# Patient Record
Sex: FEMALE | Race: WHITE | NOT HISPANIC OR LATINO | Employment: UNEMPLOYED | URBAN - METROPOLITAN AREA
[De-identification: names, ages, dates, MRNs, and addresses within clinical notes are randomized per-mention and may not be internally consistent; named-entity substitution may affect disease eponyms.]

---

## 2017-02-01 DIAGNOSIS — E03.9 HYPOTHYROIDISM: ICD-10-CM

## 2017-02-01 DIAGNOSIS — I10 ESSENTIAL HYPERTENSION: ICD-10-CM

## 2017-02-01 NOTE — TELEPHONE ENCOUNTER
Refill requests have been received- patient is due for a visit. I will allow renewal once a visit has been scheduled. Please message back.

## 2017-02-02 RX ORDER — BENAZEPRIL HYDROCHLORIDE AND HYDROCHLOROTHIAZIDE 10; 12.5 MG/1; MG/1
TABLET ORAL
Qty: 90 TABLET | Refills: 0 | Status: SHIPPED | OUTPATIENT
Start: 2017-02-02 | End: 2017-02-21 | Stop reason: SDUPTHER

## 2017-02-02 RX ORDER — LEVOTHYROXINE SODIUM 137 UG/1
TABLET ORAL
Qty: 90 TABLET | Refills: 0 | Status: SHIPPED | OUTPATIENT
Start: 2017-02-02 | End: 2017-04-11 | Stop reason: SDUPTHER

## 2017-02-02 NOTE — TELEPHONE ENCOUNTER
Pt vm box was full unable to leave message, sent pt my josephsmary message informing that appt has been scheduled for the 21 of feb.

## 2017-02-10 DIAGNOSIS — L70.9 ACNE, UNSPECIFIED ACNE TYPE: ICD-10-CM

## 2017-02-14 DIAGNOSIS — I10 ESSENTIAL HYPERTENSION: Primary | ICD-10-CM

## 2017-02-14 RX ORDER — SPIRONOLACTONE 50 MG/1
TABLET, FILM COATED ORAL
Qty: 180 TABLET | OUTPATIENT
Start: 2017-02-14

## 2017-02-16 ENCOUNTER — LAB VISIT (OUTPATIENT)
Dept: LAB | Facility: HOSPITAL | Age: 52
End: 2017-02-16
Attending: FAMILY MEDICINE
Payer: COMMERCIAL

## 2017-02-16 DIAGNOSIS — I10 ESSENTIAL HYPERTENSION: ICD-10-CM

## 2017-02-16 LAB
ANION GAP SERPL CALC-SCNC: 7 MMOL/L
BUN SERPL-MCNC: 15 MG/DL
CALCIUM SERPL-MCNC: 9.2 MG/DL
CHLORIDE SERPL-SCNC: 106 MMOL/L
CO2 SERPL-SCNC: 26 MMOL/L
CREAT SERPL-MCNC: 1 MG/DL
EST. GFR  (AFRICAN AMERICAN): >60 ML/MIN/1.73 M^2
EST. GFR  (NON AFRICAN AMERICAN): >60 ML/MIN/1.73 M^2
GLUCOSE SERPL-MCNC: 73 MG/DL
POTASSIUM SERPL-SCNC: 3.9 MMOL/L
SODIUM SERPL-SCNC: 139 MMOL/L

## 2017-02-16 PROCEDURE — 80048 BASIC METABOLIC PNL TOTAL CA: CPT

## 2017-02-16 PROCEDURE — 36415 COLL VENOUS BLD VENIPUNCTURE: CPT | Mod: PO

## 2017-02-21 ENCOUNTER — OFFICE VISIT (OUTPATIENT)
Dept: FAMILY MEDICINE | Facility: CLINIC | Age: 52
End: 2017-02-21
Payer: COMMERCIAL

## 2017-02-21 ENCOUNTER — LAB VISIT (OUTPATIENT)
Dept: LAB | Facility: HOSPITAL | Age: 52
End: 2017-02-21
Attending: FAMILY MEDICINE
Payer: COMMERCIAL

## 2017-02-21 VITALS
WEIGHT: 206.44 LBS | OXYGEN SATURATION: 99 % | DIASTOLIC BLOOD PRESSURE: 60 MMHG | HEART RATE: 60 BPM | HEIGHT: 66 IN | BODY MASS INDEX: 33.18 KG/M2 | TEMPERATURE: 97 F | RESPIRATION RATE: 13 BRPM | SYSTOLIC BLOOD PRESSURE: 102 MMHG

## 2017-02-21 DIAGNOSIS — Z11.59 NEED FOR HEPATITIS C SCREENING TEST: ICD-10-CM

## 2017-02-21 DIAGNOSIS — E66.9 OBESITY (BMI 30-39.9): ICD-10-CM

## 2017-02-21 DIAGNOSIS — E03.9 HYPOTHYROIDISM, UNSPECIFIED TYPE: ICD-10-CM

## 2017-02-21 DIAGNOSIS — E55.9 VITAMIN D INSUFFICIENCY: ICD-10-CM

## 2017-02-21 DIAGNOSIS — I10 ESSENTIAL HYPERTENSION: Primary | ICD-10-CM

## 2017-02-21 DIAGNOSIS — I10 ESSENTIAL HYPERTENSION: ICD-10-CM

## 2017-02-21 DIAGNOSIS — L70.9 ACNE, UNSPECIFIED ACNE TYPE: ICD-10-CM

## 2017-02-21 DIAGNOSIS — G43.109 MIGRAINE WITH AURA AND WITHOUT STATUS MIGRAINOSUS, NOT INTRACTABLE: ICD-10-CM

## 2017-02-21 LAB
25(OH)D3+25(OH)D2 SERPL-MCNC: 83 NG/ML
ALBUMIN SERPL BCP-MCNC: 3.8 G/DL
ALP SERPL-CCNC: 66 U/L
ALT SERPL W/O P-5'-P-CCNC: 27 U/L
ANION GAP SERPL CALC-SCNC: 10 MMOL/L
AST SERPL-CCNC: 30 U/L
BILIRUB SERPL-MCNC: 0.5 MG/DL
BUN SERPL-MCNC: 15 MG/DL
CALCIUM SERPL-MCNC: 9.5 MG/DL
CHLORIDE SERPL-SCNC: 105 MMOL/L
CHOLEST/HDLC SERPL: 5.1 {RATIO}
CO2 SERPL-SCNC: 24 MMOL/L
CREAT SERPL-MCNC: 1 MG/DL
EST. GFR  (AFRICAN AMERICAN): >60 ML/MIN/1.73 M^2
EST. GFR  (NON AFRICAN AMERICAN): >60 ML/MIN/1.73 M^2
GLUCOSE SERPL-MCNC: 92 MG/DL
HDL/CHOLESTEROL RATIO: 19.5 %
HDLC SERPL-MCNC: 195 MG/DL
HDLC SERPL-MCNC: 38 MG/DL
LDLC SERPL CALC-MCNC: 132.4 MG/DL
NONHDLC SERPL-MCNC: 157 MG/DL
POTASSIUM SERPL-SCNC: 4.2 MMOL/L
PROT SERPL-MCNC: 7.3 G/DL
SODIUM SERPL-SCNC: 139 MMOL/L
T4 FREE SERPL-MCNC: 1.35 NG/DL
TRIGL SERPL-MCNC: 123 MG/DL
TSH SERPL DL<=0.005 MIU/L-ACNC: 1.15 UIU/ML

## 2017-02-21 PROCEDURE — 3078F DIAST BP <80 MM HG: CPT | Mod: S$GLB,,, | Performed by: FAMILY MEDICINE

## 2017-02-21 PROCEDURE — 36415 COLL VENOUS BLD VENIPUNCTURE: CPT | Mod: PO

## 2017-02-21 PROCEDURE — 80053 COMPREHEN METABOLIC PANEL: CPT

## 2017-02-21 PROCEDURE — 84443 ASSAY THYROID STIM HORMONE: CPT

## 2017-02-21 PROCEDURE — 82306 VITAMIN D 25 HYDROXY: CPT

## 2017-02-21 PROCEDURE — 80061 LIPID PANEL: CPT

## 2017-02-21 PROCEDURE — 99999 PR PBB SHADOW E&M-EST. PATIENT-LVL III: CPT | Mod: PBBFAC,,, | Performed by: FAMILY MEDICINE

## 2017-02-21 PROCEDURE — 99214 OFFICE O/P EST MOD 30 MIN: CPT | Mod: S$GLB,,, | Performed by: FAMILY MEDICINE

## 2017-02-21 PROCEDURE — 86803 HEPATITIS C AB TEST: CPT

## 2017-02-21 PROCEDURE — 84439 ASSAY OF FREE THYROXINE: CPT

## 2017-02-21 PROCEDURE — 3074F SYST BP LT 130 MM HG: CPT | Mod: S$GLB,,, | Performed by: FAMILY MEDICINE

## 2017-02-21 RX ORDER — FROVATRIPTAN SUCCINATE 2.5 MG/1
2.5 TABLET, FILM COATED ORAL
Qty: 27 TABLET | Refills: 1 | Status: SHIPPED | OUTPATIENT
Start: 2017-02-21

## 2017-02-21 RX ORDER — BENAZEPRIL HYDROCHLORIDE AND HYDROCHLOROTHIAZIDE 10; 12.5 MG/1; MG/1
1 TABLET ORAL DAILY
Qty: 90 TABLET | Refills: 1 | Status: SHIPPED | OUTPATIENT
Start: 2017-02-21 | End: 2017-09-21 | Stop reason: SDUPTHER

## 2017-02-21 RX ORDER — SUMATRIPTAN 20 MG/1
1 SPRAY NASAL
Qty: 16 EACH | Refills: 1 | Status: SHIPPED | OUTPATIENT
Start: 2017-02-21

## 2017-02-21 RX ORDER — DICLOFENAC SODIUM 50 MG/1
TABLET, DELAYED RELEASE ORAL
Refills: 1 | COMMUNITY
Start: 2017-02-18

## 2017-02-21 RX ORDER — SPIRONOLACTONE 50 MG/1
50 TABLET, FILM COATED ORAL 2 TIMES DAILY
Qty: 180 TABLET | Refills: 1 | Status: SHIPPED | OUTPATIENT
Start: 2017-02-21
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
## 2017-02-21 NOTE — MR AVS SNAPSHOT
[facility]  [address]  [facility] [address]  Phone: [phone]  Fax: [phone]                  Melodie Last   2017 7:00 AM   Office Visit    Description:  Female : 1965   Provider:  Lea Muir MD   Department:  [facility]           Reason for Visit     Chronic Care           Diagnoses this Visit        Comments    Essential hypertension    -  Primary     Migraine with aura and without status migrainosus, not intractable         Acne, unspecified acne type         Hypothyroidism, unspecified type         Vitamin D insufficiency         Obesity (BMI 30-39.9)         Need for hepatitis C screening test                To Do List           Future Appointments        Provider Department Dept Phone    2017 9:10 AM [facility], [facility] [facility]     2017 7:00 AM Lea Muir MD [facility] [phone]      Goals (5 Years of Data)     None      Follow-Up and Disposition     Return in about 6 months (around 2017).    Follow-up and Disposition History       These Medications        Disp Refills Start End    sumatriptan (IMITREX) 20 mg/actuation nasal spray 16 each 1 2017     1 spray (20 mg total) by Nasal route every 2 (two) hours as needed for Migraine. - Nasal    [facility] - [address] Ph #: [phone]       spironolactone (ALDACTONE) 50 MG tablet 180 tablet 1 2017     Take 1 tablet (50 mg total) by mouth 2 (two) times daily. - Oral    [facility]: [facility] - [address] Ph #: [phone]       frovatriptan (FROVA) 2.5 MG tablet 27 tablet 1 2017     Take 1 tablet (2.5 mg total) by mouth as needed for Migraine. If recurs, may repeat after 2 hours. Max of 3 tabs in 24 hours. - Oral    Pharmacy: [facility] - [address] Ph #:  [phone]       benazepril-hydrochlorthiazide (LOTENSIN HCT) 10-12.5 mg Tab 90 tablet 1 2/21/2017     Take 1 tablet by mouth once daily. - Oral    Pharmacy: [facility] - [address] #: [phone]         [facility] On Call     [facility] On Call Nurse Care Line - 24/7 Assistance  Registered nurses in the [facility] On Call Center provide clinical advisement, health education, appointment booking, and other advisory services.  Call for this free service at [phone].             Medications           Message regarding Medications     Verify the changes and/or additions to your medication regime listed below are the same as discussed with your clinician today.  If any of these changes or additions are incorrect, please notify your healthcare provider.        CHANGE how you are taking these medications     Start Taking Instead of    benazepril-hydrochlorthiazide (LOTENSIN HCT) 10-12.5 mg Tab benazepril-hydrochlorthiazide (LOTENSIN HCT) 10-12.5 mg Tab    Dosage:  Take 1 tablet by mouth once daily. Dosage:  Take 1 tablet by mouth once daily    Reason for Change:  Reorder            Verify that the below list of medications is an accurate representation of the medications you are currently taking.  If none reported, the list may be blank. If incorrect, please contact your healthcare provider. Carry this list with you in case of emergency.           Current Medications     benazepril-hydrochlorthiazide (LOTENSIN HCT) 10-12.5 mg Tab Take 1 tablet by mouth once daily.    CALCIUM & MAGNESIUM CARBONATES ORAL Take by mouth.    coQ10, ubiquinol, 100 mg Cap Take by mouth once daily.    diclofenac (VOLTAREN) 50 MG EC tablet TK 1 T PO BID WF    frovatriptan (FROVA) 2.5 MG tablet Take 1 tablet (2.5 mg total) by mouth as needed for Migraine. If recurs, may repeat after 2 hours. Max of 3 tabs in 24 hours.    levothyroxine (SYNTHROID) 137 MCG Tab tablet Take 1 tablet by mouth  before breakfast     "magnesium 250 mg Tab Take 500 tablets by mouth once daily.    multivitamin (ONE DAILY MULTIVITAMIN) per tablet Take 1 tablet by mouth once daily.    omega-3 fatty acids-vitamin E (FISH OIL) 1,000 mg Cap Take by mouth.    spironolactone (ALDACTONE) 50 MG tablet Take 1 tablet (50 mg total) by mouth 2 (two) times daily.    sumatriptan (IMITREX) 20 mg/actuation nasal spray 1 spray (20 mg total) by Nasal route every 2 (two) hours as needed for Migraine.    vitamin D 1000 units Tab Take 2,000 mg by mouth once daily.           Clinical Reference Information           Your Vitals Were     BP Pulse Temp Resp Height Weight    102/60 60 97.1 °F (36.2 °C) (Temporal) 13 5' 6" (1.676 m) 93.6 kg (206 lb 7.4 oz)    Last Period SpO2 BMI          02/07/2017 99% 33.32 kg/m2        Blood Pressure          Most Recent Value    BP  102/60      Allergies as of 2/21/2017     Sulfa (Sulfonamide Antibiotics)      Immunizations Administered on Date of Encounter - 2/21/2017     None      Orders Placed During Today's Visit     Future Labs/Procedures Expected by Expires    Comprehensive metabolic panel  2/21/2017 4/22/2018    Hepatitis C antibody  2/21/2017 4/22/2018    Lipid panel  2/21/2017 4/22/2018    T4, free  2/21/2017 4/22/2018    TSH  2/21/2017 4/22/2018    Vitamin D  2/21/2017 4/22/2018      Language Assistance Services     ATTENTION: Language assistance services are available, free of charge. Please call [phone].      ATENCIÓN: Si habla español, tiene a morrissey disposición servicios gratuitos de asistencia lingüística. Llame al [phone].     ALIVIA Ý: N?u b?n nói Ti?ng Vi?t, có các d?ch v? h? tr? ngôn ng? mi?n phí dành cho b?n. G?i s? [phone].         [facility] complies with applicable Federal civil rights laws and does not discriminate on the basis of race, color, national origin, age, disability, or sex.        "

## 2017-02-22 LAB — HCV AB SERPL QL IA: NEGATIVE

## 2017-02-27 ENCOUNTER — TELEPHONE (OUTPATIENT)
Dept: FAMILY MEDICINE | Facility: CLINIC | Age: 52
End: 2017-02-27

## 2017-02-27 ENCOUNTER — OFFICE VISIT (OUTPATIENT)
Dept: URGENT CARE | Facility: CLINIC | Age: 52
End: 2017-02-27
Payer: COMMERCIAL

## 2017-02-27 ENCOUNTER — HOSPITAL ENCOUNTER (OUTPATIENT)
Dept: RADIOLOGY | Facility: HOSPITAL | Age: 52
Discharge: HOME | End: 2017-02-27
Attending: NURSE PRACTITIONER
Payer: COMMERCIAL

## 2017-02-27 VITALS
OXYGEN SATURATION: 100 % | SYSTOLIC BLOOD PRESSURE: 100 MMHG | HEART RATE: 76 BPM | TEMPERATURE: 98 F | DIASTOLIC BLOOD PRESSURE: 70 MMHG | RESPIRATION RATE: 18 BRPM

## 2017-02-27 DIAGNOSIS — S99.912A LEFT ANKLE INJURY, INITIAL ENCOUNTER: Primary | ICD-10-CM

## 2017-02-27 DIAGNOSIS — R93.89 ABNORMAL X-RAY: ICD-10-CM

## 2017-02-27 PROCEDURE — 99999 PR PBB SHADOW E&M-EST. PATIENT-LVL III: CPT | Mod: PBBFAC,,, | Performed by: NURSE PRACTITIONER

## 2017-02-27 PROCEDURE — 73610 X-RAY EXAM OF ANKLE: CPT | Mod: TC,PO,LT

## 2017-02-27 PROCEDURE — 73630 X-RAY EXAM OF FOOT: CPT | Mod: 26,LT,, | Performed by: RADIOLOGY

## 2017-02-27 PROCEDURE — 3078F DIAST BP <80 MM HG: CPT | Mod: S$GLB,,, | Performed by: NURSE PRACTITIONER

## 2017-02-27 PROCEDURE — 3074F SYST BP LT 130 MM HG: CPT | Mod: S$GLB,,, | Performed by: NURSE PRACTITIONER

## 2017-02-27 PROCEDURE — 99213 OFFICE O/P EST LOW 20 MIN: CPT | Mod: S$GLB,,, | Performed by: NURSE PRACTITIONER

## 2017-02-27 PROCEDURE — 73630 X-RAY EXAM OF FOOT: CPT | Mod: TC,PO,LT

## 2017-02-27 PROCEDURE — 73610 X-RAY EXAM OF ANKLE: CPT | Mod: 26,LT,, | Performed by: RADIOLOGY

## 2017-02-27 PROCEDURE — 1160F RVW MEDS BY RX/DR IN RCRD: CPT | Mod: S$GLB,,, | Performed by: NURSE PRACTITIONER

## 2017-02-27 NOTE — TELEPHONE ENCOUNTER
Note      Please let patient know C scope and EGD records have been received from [facility]. Last C scope was in January 2011 with repeat recommended in 10 years. EGD was performed at that time with no plans for repeat. Records will be updated.            Pt was notified

## 2017-02-27 NOTE — TELEPHONE ENCOUNTER
Please let patient know C scope and EGD records have been received from [facility]. Last C scope was in January 2011 with repeat recommended in 10 years. EGD was performed at that time with no plans for repeat. Records will be updated.

## 2017-02-27 NOTE — TELEPHONE ENCOUNTER
----- Message from Kaleb Brian sent at 2/27/2017 11:57 AM CST -----  Contact: PT   States she's rtn nurses call and can be reached at [phone]//thanks/dbw

## 2017-02-27 NOTE — MR AVS SNAPSHOT
[facility]  [address]  [facility] [address]  Phone: [phone]  Fax: [phone]                  Melodie Last   2017 5:20 PM   Office Visit    Description:  Female : 1965   Provider:  Kera Alford NP   Department:  [facility]           Diagnoses this Visit        Comments    Left ankle injury, initial encounter    -  Primary     Abnormal x-ray                To Do List           Future Appointments        Provider Department Dept Phone    2017 7:00 AM Lea Muir MD [facility] [phone]      Goals (5 Years of Data)     None      [facility] On Call     [facility] On Call Nurse Care Line -  Assistance  Registered nurses in the [facility] On Call Center provide clinical advisement, health education, appointment booking, and other advisory services.  Call for this free service at [phone].             Medications           Message regarding Medications     Verify the changes and/or additions to your medication regime listed below are the same as discussed with your clinician today.  If any of these changes or additions are incorrect, please notify your healthcare provider.             Verify that the below list of medications is an accurate representation of the medications you are currently taking.  If none reported, the list may be blank. If incorrect, please contact your healthcare provider. Carry this list with you in case of emergency.           Current Medications     benazepril-hydrochlorthiazide (LOTENSIN HCT) 10-12.5 mg Tab Take 1 tablet by mouth once daily.    CALCIUM & MAGNESIUM CARBONATES ORAL Take by mouth.    coQ10, ubiquinol, 100 mg Cap Take by mouth once daily.    diclofenac (VOLTAREN) 50 MG EC tablet TK 1 T PO BID WF    frovatriptan (FROVA) 2.5 MG tablet Take 1 tablet (2.5 mg total) by mouth as needed for Migraine. If recurs, may repeat after 2 hours. Max of 3 tabs in 24 hours.     levothyroxine (SYNTHROID) 137 MCG Tab tablet Take 1 tablet by mouth  before breakfast    magnesium 250 mg Tab Take 500 tablets by mouth once daily.    multivitamin (ONE DAILY MULTIVITAMIN) per tablet Take 1 tablet by mouth once daily.    omega-3 fatty acids-vitamin E (FISH OIL) 1,000 mg Cap Take by mouth.    spironolactone (ALDACTONE) 50 MG tablet Take 1 tablet (50 mg total) by mouth 2 (two) times daily.    sumatriptan (IMITREX) 20 mg/actuation nasal spray 1 spray (20 mg total) by Nasal route every 2 (two) hours as needed for Migraine.    vitamin D 1000 units Tab Take 2,000 mg by mouth once daily.           Clinical Reference Information           Your Vitals Were     BP                   100/70 (BP Location: Left arm, Patient Position: Sitting, BP Method: Manual)           Blood Pressure          Most Recent Value    BP  100/70      Allergies as of 2/27/2017     Sulfa (Sulfonamide Antibiotics)      Immunizations Administered on Date of Encounter - 2/27/2017     None      Orders Placed During Today's Visit      Normal Orders This Visit    Ambulatory referral to Podiatry     X-Ray Ankle Complete Left     X-Ray Foot Complete Left       Instructions      Understanding an Ankle Fracture     The ankle is formed by bones in the lower leg (tibia and fibula) and the bone on top of the foot (talus). When you have a fracture of the ankle, it means that one or more of the bones in the ankle are broken. The bone may be cracked, broken into two or more pieces, or even shattered. The pieces of bone may be lined up or they may have moved out of place. Sometimes, the bone may break through the skin. Nearby ligaments may also be damaged. Depending on how badly the bone is broken, healing may take a few months or longer.   What causes an ankle fracture?  Ankle fractures are often caused from severely twisting or rolling the ankle. They may also be caused from a fall, blow, accident, or sports injury.  Symptoms of an ankle  fracture  Symptoms can include pain, swelling, and bruising. If the bone breaks through the skin, bleeding at the site can also occur. The ankle may look crooked, deformed, or bent. Also, it may be hard to move or use the ankle and foot as you would normally.  Treating an ankle fracture  Treatment for an ankle fracture depends on where the bone is broken and how serious the break is. If needed, the bone is put back into place. This may be done with or without surgery. If surgery is needed, the surgeon may use devices such as pins, plates, or screws to hold the bone together. Usually, you will wear a splint, brace, or short leg cast to keep the bone in place and protect it from injury during healing. Other treatments may also be used to help reduce symptoms or regain function. These include:  · Rest. You may need to avoid walking or putting any weight on the broken ankle for a period of time. Severe fractures need a longer limit on weight-bearing activities.  · Cold packs. Putting an ice pack over the injured area may help reduce swelling and pain.  · Compression. An elastic bandage may be wrapped around the ankle to help reduce swelling.  · Elevation. Propping up the ankle so that it is above your heart may ease swelling.  · Pain medicines. Prescription or over-the-counter pain medicines may help reduce pain and swelling. If needed, stronger pain medicines may be prescribed.  · Exercises. Your healthcare provider may give you certain exercises to do at home or with a physical therapist. These help restore strength, flexibility, and range of motion in the ankle and foot.  Possible complications of an ankle fracture  These can include:  · Poor healing of the bone  · Weakness, stiffness, or loss of range of motion in the ankle  · Osteoarthritis in the ankle  When to call your healthcare provider  Call your healthcare provider right away if you have any of these:  · Fever of 100.4°F (38°C) or higher, or as  directed  · Symptoms that dont get better with treatment, or get worse  · Numbness, tingling, or coldness in your foot or toes  · Toenails that turn blue or grey in color  · A splint, brace, or cast that is damaged or feels too tight or loose  · Unusual redness, warmth, swelling, bleeding, or drainage from any wounds or incision sites  · New symptoms   Date Last Reviewed: 3/10/2016  © 0532-4060 [employer]. [address] [place] [address]. All rights reserved. This information is not intended as a substitute for professional medical care. Always follow your healthcare professional's instructions.             Language Assistance Services     ATTENTION: Language assistance services are available, free of charge. Please call [phone].      ATENCIÓN: Si enrrique mccabe, tiene a morrissey disposición servicios gratuitos de asistencia lingüística. Llame al [phone].     CHÚ Ý: N?u b?n nói Ti?ng Vi?t, có các d?ch v? h? tr? ngôn ng? mi?n phí dành cho b?n. G?i s? [phone].         [facility] complies with applicable Federal civil rights laws and does not discriminate on the basis of race, color, national origin, age, disability, or sex.

## 2017-02-28 ENCOUNTER — OFFICE VISIT (OUTPATIENT)
Dept: PODIATRY | Facility: CLINIC | Age: 52
End: 2017-02-28
Payer: COMMERCIAL

## 2017-02-28 VITALS
SYSTOLIC BLOOD PRESSURE: 113 MMHG | WEIGHT: 206.38 LBS | BODY MASS INDEX: 33.17 KG/M2 | DIASTOLIC BLOOD PRESSURE: 69 MMHG | HEIGHT: 66 IN | HEART RATE: 71 BPM

## 2017-02-28 DIAGNOSIS — S82.892A ANKLE FRACTURE, LEFT, CLOSED, INITIAL ENCOUNTER: Primary | ICD-10-CM

## 2017-02-28 PROCEDURE — 99204 OFFICE O/P NEW MOD 45 MIN: CPT | Mod: S$GLB,,, | Performed by: PODIATRIST

## 2017-02-28 PROCEDURE — 99999 PR PBB SHADOW E&M-EST. PATIENT-LVL III: CPT | Mod: PBBFAC,,, | Performed by: PODIATRIST

## 2017-02-28 PROCEDURE — 1160F RVW MEDS BY RX/DR IN RCRD: CPT | Mod: S$GLB,,, | Performed by: PODIATRIST

## 2017-02-28 PROCEDURE — 3074F SYST BP LT 130 MM HG: CPT | Mod: S$GLB,,, | Performed by: PODIATRIST

## 2017-02-28 PROCEDURE — 3078F DIAST BP <80 MM HG: CPT | Mod: S$GLB,,, | Performed by: PODIATRIST

## 2017-02-28 NOTE — PATIENT INSTRUCTIONS
[employer]    Address  [address]  [place] [address]  Contact  Phone: [phone]  Toll [phone]    Knee scooters for rent: App[employer] $50.00/month

## 2017-02-28 NOTE — PROGRESS NOTES
Subjective:       Patient ID: Melodie Last is a 52 y.o. female.    Chief Complaint: No chief complaint on file.    Ankle Injury    The incident occurred 6 to 12 hours ago. The injury mechanism was a twisting injury. The pain is present in the left ankle. The quality of the pain is described as aching. The pain is at a severity of 7/10. The pain is moderate. Pertinent negatives include no inability to bear weight, loss of motion, loss of sensation, muscle weakness, numbness or tingling. She has tried NSAIDs for the symptoms. The treatment provided no relief.     Review of Systems   Musculoskeletal: Positive for gait problem and joint swelling. Negative for back pain.   Skin: Negative for wound.   Neurological: Negative for tingling and numbness.       Objective:      Physical Exam   Musculoskeletal:        Left ankle: She exhibits swelling and ecchymosis. She exhibits normal range of motion, no deformity, no laceration and normal pulse. Tenderness. Lateral malleolus tenderness found. Achilles tendon normal.        Left foot: There is tenderness and swelling. There is normal range of motion, no bony tenderness, normal capillary refill, no crepitus, no deformity and no laceration.        Feet:        Assessment:       1. Left ankle injury, initial encounter    2. Abnormal x-ray        Plan:   Diagnoses and all orders for this visit:    Left ankle injury, initial encounter  -     X-Ray Ankle Complete Left  -     X-Ray Foot Complete Left  -     Ambulatory referral to Podiatry    Abnormal x-ray  -     Ambulatory referral to Podiatry      3 views left ankle and 3 views left foot demonstrate a mild hallux valgus deformity with bunion bunion formation. There is mild forefoot joint space narrowing. A small corticated ossification projects inferior to the lateral malleolus. A small avulsion injury cannot be excluded.    Posterior/ankle stirrup splint applied, crutches provided. Counseled on using ice, continue NSAIDs, gentle  ROM, elevation, rest, avoid aggravating factors., non weightbearing. Follow up with podiatry as scheduled or sooner if fever, redness/color change, increased pain, increased swelling to area and/or numbness/tingling to extremity.  -     Diagnosis, treatment, AVS reviewed with patient  -     Patient understands and agrees with plan

## 2017-02-28 NOTE — LETTER
March 5, 2017      Kera Alford NP  [address]  [facility] [address]           [facility]  [id number] [place] [address]  Phone: [phone]  Fax: [phone]          Patient: Melodie Last   MR Number: [id number]   YOB: 1965   Date of Visit: 2/28/2017       Dear Kera Alford:    Thank you for referring Melodie Last to me for evaluation. Attached you will find relevant portions of my assessment and plan of care.    If you have questions, please do not hesitate to call me. I look forward to following Melodie Last along with you.    Sincerely,    Sadia Hinds DPM    Enclosure  CC:  No Recipients    If you would like to receive this communication electronically, please contact [email or web address] or [phone] to request more information on [employer] access.    For providers and/or their staff who would like to refer a patient to [facility], please contact us through our one-stop-shop provider referral line, Jenifer Moreno, at [phone].    If you feel you have received this communication in error or would no longer like to receive these types of communications, please e-mail [email or web address]

## 2017-02-28 NOTE — MR AVS SNAPSHOT
[facility]  [address] [facility] [address]  Phone: [phone]  Fax: [phone]                  Melodie Last   2017 2:20 PM   Office Visit    Description:  Female : 1965   Provider:  Sadia Hinds DPM   Department:  [facility]           Reason for Visit     Ankle Injury           Diagnoses this Visit        Comments    Ankle fracture, left, closed, initial encounter    -  Primary            To Do List           Future Appointments        Provider Department Dept Phone    3/28/2017 4:40 PM Sadia Hinds DPM [facility] [phone]    2017 7:00 AM Lea Muir MD [facility] [phone]      Goals (5 Years of Data)     None      [facility]     [facility] On Call Nurse Care Line -  Assistance  Registered nurses in the [facility] On Call Center provide clinical advisement, health education, appointment booking, and other advisory services.  Call for this free service at [phone].             Medications           Message regarding Medications     Verify the changes and/or additions to your medication regime listed below are the same as discussed with your clinician today.  If any of these changes or additions are incorrect, please notify your healthcare provider.             Verify that the below list of medications is an accurate representation of the medications you are currently taking.  If none reported, the list may be blank. If incorrect, please contact your healthcare provider. Carry this list with you in case of emergency.           Current Medications     benazepril-hydrochlorthiazide (LOTENSIN HCT) 10-12.5 mg Tab Take 1 tablet by mouth once daily.    CALCIUM & MAGNESIUM CARBONATES ORAL Take by mouth.    coQ10, ubiquinol, 100 mg Cap Take by mouth once daily.    diclofenac (VOLTAREN) 50 MG EC tablet TK 1 T PO BID WF    frovatriptan (FROVA) 2.5 MG tablet Take 1 tablet (2.5 mg total) by mouth as needed  for Migraine. If recurs, may repeat after 2 hours. Max of 3 tabs in 24 hours.    levothyroxine (SYNTHROID) 137 MCG Tab tablet Take 1 tablet by mouth  before breakfast    magnesium 250 mg Tab Take 500 tablets by mouth once daily.    multivitamin (ONE DAILY MULTIVITAMIN) per tablet Take 1 tablet by mouth once daily.    omega-3 fatty acids-vitamin E (FISH OIL) 1,000 mg Cap Take by mouth.    spironolactone (ALDACTONE) 50 MG tablet Take 1 tablet (50 mg total) by mouth 2 (two) times daily.    sumatriptan (IMITREX) 20 mg/actuation nasal spray 1 spray (20 mg total) by Nasal route every 2 (two) hours as needed for Migraine.    vitamin D 1000 units Tab Take 2,000 mg by mouth once daily.           Clinical Reference Information           Your Vitals Were     BP                   113/69 (BP Location: Right arm, Patient Position: Sitting, BP Method: Automatic)           Blood Pressure          Most Recent Value    BP  113/69      Allergies as of 2/28/2017     Sulfa (Sulfonamide Antibiotics)      Immunizations Administered on Date of Encounter - 2/28/2017     None      Orders Placed During Today's Visit     Future Labs/Procedures Expected by Expires    X-Ray Ankle Complete Left  2/28/2017 2/28/2018      Instructions    ACTION REHAB AND SUPPLY [employer]    Address  [facility] Chris Ramos [address] [place] [id number]  Contact  Phone: [phone]  Toll [phone]    Knee scooters for rent: [employer] $50.00/month         Language Assistance Services     ATTENTION: Language assistance services are available, free of charge. Please call [phone].      ATENCIÓN: Si habla eliot, tiene a morrissey disposición servicios gratuitos de asistencia lingüística. Llame al [phone].     [facility] Ý: N?u b?n nói Ti?ng Vi?t, có các d?ch v? h? tr? ngôn ng? mi?n phí dành cho b?n. G?i s? [phone].         [facility] complies with applicable Federal civil rights laws and does not discriminate on the basis of race, color, national origin, age,  disability, or sex.

## 2017-02-28 NOTE — PATIENT INSTRUCTIONS
Understanding an Ankle Fracture     The ankle is formed by bones in the lower leg (tibia and fibula) and the bone on top of the foot (talus). When you have a fracture of the ankle, it means that one or more of the bones in the ankle are broken. The bone may be cracked, broken into two or more pieces, or even shattered. The pieces of bone may be lined up or they may have moved out of place. Sometimes, the bone may break through the skin. Nearby ligaments may also be damaged. Depending on how badly the bone is broken, healing may take a few months or longer.   What causes an ankle fracture?  Ankle fractures are often caused from severely twisting or rolling the ankle. They may also be caused from a fall, blow, accident, or sports injury.  Symptoms of an ankle fracture  Symptoms can include pain, swelling, and bruising. If the bone breaks through the skin, bleeding at the site can also occur. The ankle may look crooked, deformed, or bent. Also, it may be hard to move or use the ankle and foot as you would normally.  Treating an ankle fracture  Treatment for an ankle fracture depends on where the bone is broken and how serious the break is. If needed, the bone is put back into place. This may be done with or without surgery. If surgery is needed, the surgeon may use devices such as pins, plates, or screws to hold the bone together. Usually, you will wear a splint, brace, or short leg cast to keep the bone in place and protect it from injury during healing. Other treatments may also be used to help reduce symptoms or regain function. These include:  · Rest. You may need to avoid walking or putting any weight on the broken ankle for a period of time. Severe fractures need a longer limit on weight-bearing activities.  · Cold packs. Putting an ice pack over the injured area may help reduce swelling and pain.  · Compression. An elastic bandage may be wrapped around the ankle to help reduce swelling.  · Elevation. Propping  up the ankle so that it is above your heart may ease swelling.  · Pain medicines. Prescription or over-the-counter pain medicines may help reduce pain and swelling. If needed, stronger pain medicines may be prescribed.  · Exercises. Your healthcare provider may give you certain exercises to do at home or with a physical therapist. These help restore strength, flexibility, and range of motion in the ankle and foot.  Possible complications of an ankle fracture  These can include:  · Poor healing of the bone  · Weakness, stiffness, or loss of range of motion in the ankle  · Osteoarthritis in the ankle  When to call your healthcare provider  Call your healthcare provider right away if you have any of these:  · Fever of 100.4°F (38°C) or higher, or as directed  · Symptoms that dont get better with treatment, or get worse  · Numbness, tingling, or coldness in your foot or toes  · Toenails that turn blue or grey in color  · A splint, brace, or cast that is damaged or feels too tight or loose  · Unusual redness, warmth, swelling, bleeding, or drainage from any wounds or incision sites  · New symptoms   Date Last Reviewed: 3/10/2016  © 9756-5084 [email or web address]. [address] [place] [address], [place] [address]. All rights reserved. This information is not intended as a substitute for professional medical care. Always follow your healthcare professional's instructions.

## 2017-03-06 NOTE — PROGRESS NOTES
Subjective:     Patient ID: Melodie Last is a 52 y.o. female.    Chief Complaint: Ankle Injury (left ankle injury possible fracture, pt presented with left foot splint and crutches DOI 2/27/17 pt has swelling and bruising to left ankle and minor pain)    Melodie is a 52 y.o. female who presents to the podiatry clinic  with complaint of  left foot pain. Onset of the symptoms was yesterday. Precipitating event: inversion injury to foot and inversion injury to ankle. Current symptoms include: pain at the lateral aspect of the ankle, pain with inversion of the foot and swelling. Aggravating factors: any weight bearing. Symptoms have stabilized. Patient has had prior foot problems. Evaluation to date: plain films: avuslion fracture. Treatment to date: splint with crutches. Patients rates pain 4/10 on pain scale. Patient states she has had previous surgery to the left ankle and had surgery for cyst and tendon on the left ankle several years ago.       Patient Active Problem List   Diagnosis    Essential hypertension    Migraine with aura and without status migrainosus, not intractable    Acne    Hypothyroidism    Vitamin D insufficiency    Obesity (BMI 30-39.9)       Medication List with Changes/Refills   Current Medications    BENAZEPRIL-HYDROCHLORTHIAZIDE (LOTENSIN HCT) 10-12.5 MG TAB    Take 1 tablet by mouth once daily.    CALCIUM & MAGNESIUM CARBONATES ORAL    Take by mouth.    COQ10, UBIQUINOL, 100 MG CAP    Take by mouth once daily.    DICLOFENAC (VOLTAREN) 50 MG EC TABLET    TK 1 T PO BID WF    FROVATRIPTAN (FROVA) 2.5 MG TABLET    Take 1 tablet (2.5 mg total) by mouth as needed for Migraine. If recurs, may repeat after 2 hours. Max of 3 tabs in 24 hours.    LEVOTHYROXINE (SYNTHROID) 137 MCG TAB TABLET    Take 1 tablet by mouth  before breakfast    MAGNESIUM 250 MG TAB    Take 500 tablets by mouth once daily.    MULTIVITAMIN (ONE DAILY MULTIVITAMIN) PER TABLET    Take 1 tablet by mouth once daily.    OMEGA-3  "FATTY ACIDS-VITAMIN E (FISH OIL) 1,000 MG CAP    Take by mouth.    SPIRONOLACTONE (ALDACTONE) 50 MG TABLET    Take 1 tablet (50 mg total) by mouth 2 (two) times daily.    SUMATRIPTAN (IMITREX) 20 MG/ACTUATION NASAL SPRAY    1 spray (20 mg total) by Nasal route every 2 (two) hours as needed for Migraine.    VITAMIN D 1000 UNITS TAB    Take 2,000 mg by mouth once daily.       Review of patient's allergies indicates:   Allergen Reactions    Sulfa (sulfonamide antibiotics)        Past Surgical History:   Procedure Laterality Date     SECTION      CHOLECYSTECTOMY         Family History   Problem Relation Age of Onset    Hyperlipidemia Mother     Diabetes Mother     Heart disease Mother     Hyperlipidemia Father     Kidney disease Father     Cancer Maternal Grandmother     Heart disease Maternal Grandmother     Cancer Maternal Grandfather     Heart disease Maternal Grandfather     Cancer Paternal Grandmother     Cancer Paternal Grandfather        Social History     Social History    Marital status:      Spouse name: N/A    Number of children: N/A    Years of education: N/A     Occupational History    Not on file.     Social History Main Topics    Smoking status: Never Smoker    Smokeless tobacco: Never Used    Alcohol use 1.8 oz/week     2 Glasses of wine, 1 Cans of beer per week    Drug use: Not on file    Sexual activity: Yes     Other Topics Concern    Not on file     Social History Narrative       Vitals:    17 1439   BP: 113/69   Pulse: 71   Weight: 93.6 kg (206 lb 5.6 oz)   Height: 5' 6" (1.676 m)   PainSc:   4   PainLoc: Ankle       Review of Systems   Constitutional: Negative for chills and fever.   Respiratory: Negative for shortness of breath.    Cardiovascular: Negative for chest pain, palpitations, orthopnea, claudication and leg swelling.   Gastrointestinal: Negative for diarrhea, nausea and vomiting.   Musculoskeletal: Negative for joint pain.   Skin: Negative for " rash.   Neurological: Negative for dizziness, tingling, sensory change, focal weakness and weakness.   Psychiatric/Behavioral: Negative.              Objective:       PHYSICAL EXAM: Apperance: Alert and orient in no distress,well developed, and with good attention to grooming and body habits  Patient presents ambulating with crutches and posterior splint intact to left foot/ankle  Lower Extremity Physical Exam:  VASCULAR: Dorsalis pedis pulses 2/4 bilateral and Posterior Tibial pulses 2/4 bilateral. Capillary fill time <3 seconds bilateral. Mild edema observed left. Varicosities absent bilateral. Skin temperature of the lower extremities is warm to warm, proximal to distal. Hair growth WNL bilateral.  DERMATOLOGICAL: No skin rashes, subcutaneous nodules, lesions, or ulcers observed bilateral.  NEUROLOGICAL: Light touch, sharp-dull, proprioception all present and equal bilaterally.    MUSCULOSKELETAL: Muscle strength is 5/5 for foot inverters, everters, plantarflexors, and dorsiflexors. Muscle tone is normal. (+) pain on palpation of left lateral and anterior ankle. Pain with left foot inversion and plantarflexion.     TEST RESULTS: Radiographs of left ankle and foot reveals 3 views left ankle and 3 views left foot demonstrate a mild hallux valgus deformity with bunion bunion formation. There is mild forefoot joint space narrowing. A small corticated ossification projects inferior to the lateral malleolus. A small avulsion injury cannot be excluded.       Assessment:       Encounter Diagnosis   Name Primary?    Ankle fracture, left, closed, initial encounter Yes         Plan:   Ankle fracture, left, closed, initial encounter  -     X-Ray Ankle Complete Left; Future; Expected date: 2/28/17  -     E - OTHER      I counseled the patient on her conditions, their implications and medical management.  Reviewed x-rays yesterday with patient in exam room.  Patient was fitted with short walking boot and instructed on  proper usage. Patient instructed to continue to ambulate with crutches.   Prescription written for Rollator.   Ordered left ankle xrays for next visit.   Patient to return in 4-6 weeks.             Sadia Hinds DPM  [facility]

## 2017-04-11 DIAGNOSIS — E03.9 HYPOTHYROIDISM: ICD-10-CM

## 2017-04-11 RX ORDER — LEVOTHYROXINE SODIUM 137 UG/1
TABLET ORAL
Qty: 90 TABLET | Refills: 1 | Status: SHIPPED | OUTPATIENT
Start: 2017-04-11 | End: 2017-05-15 | Stop reason: SDUPTHER

## 2017-05-14 ENCOUNTER — PATIENT MESSAGE (OUTPATIENT)
Dept: FAMILY MEDICINE | Facility: CLINIC | Age: 52
End: 2017-05-14

## 2017-05-15 DIAGNOSIS — E03.9 HYPOTHYROIDISM, UNSPECIFIED TYPE: ICD-10-CM

## 2017-05-15 DIAGNOSIS — E03.9 HYPOTHYROIDISM: ICD-10-CM

## 2017-05-15 RX ORDER — LEVOTHYROXINE SODIUM 137 UG/1
TABLET ORAL
Qty: 90 TABLET | Refills: 1 | Status: SHIPPED | OUTPATIENT
Start: 2017-05-15

## 2017-05-15 RX ORDER — LEVOTHYROXINE SODIUM 137 UG/1
TABLET ORAL
Qty: 90 TABLET | Refills: 1 | Status: CANCELLED | OUTPATIENT
Start: 2017-05-15

## 2017-05-15 NOTE — TELEPHONE ENCOUNTER
Pt had changed pharmacies synthroid will need to go to [facility]. medcard updated

## 2017-09-21 ENCOUNTER — TELEPHONE (OUTPATIENT)
Dept: FAMILY MEDICINE | Facility: CLINIC | Age: 52
End: 2017-09-21

## 2017-09-21 DIAGNOSIS — I10 ESSENTIAL HYPERTENSION: ICD-10-CM

## 2017-09-21 NOTE — TELEPHONE ENCOUNTER
----- Message from Marsha Vela sent at 9/21/2017 11:35 AM CDT -----  Contact: Roberto  Request a call concerning a prescription refill, can be reached at  [phone]///[email or web address]

## 2017-09-21 NOTE — TELEPHONE ENCOUNTER
lov 2/21/17 pt never returned for a appt.     Pharmacy from [place] called stating pt needed her spironolactone refilled. pharmacist did not know details. Informed them that Dr. Millan [place] and the pt needed to call our office. Verbalized understanding

## 2017-09-21 NOTE — TELEPHONE ENCOUNTER
Pt stated she has moved and needs refill of her medication .   Pt did not say how long she has been gone.   lov 2/16/17

## 2017-09-22 RX ORDER — BENAZEPRIL HYDROCHLORIDE AND HYDROCHLOROTHIAZIDE 10; 12.5 MG/1; MG/1
1 TABLET ORAL DAILY
Qty: 30 TABLET | Refills: 0 | Status: SHIPPED | OUTPATIENT
Start: 2017-09-22

## 2019-12-02 ENCOUNTER — PATIENT OUTREACH (OUTPATIENT)
Dept: ADMINISTRATIVE | Facility: HOSPITAL | Age: 54
End: 2019-12-02

## 2019-12-02 NOTE — PROGRESS NOTES
Unable to contact pt Voice mailbox full        Anni HOLLINS LPN Care Coordinator  Care Coordination Department  [facility]  [phone]

## 2021-08-11 ENCOUNTER — TELEPHONE (OUTPATIENT)
Dept: INTERNAL MEDICINE | Facility: CLINIC | Age: 56
End: 2021-08-11

## 2024-11-01 ENCOUNTER — TELEPHONE (OUTPATIENT)
Dept: INFECTIOUS DISEASES | Facility: CLINIC | Age: 59
End: 2024-11-01
Payer: COMMERCIAL